# Patient Record
Sex: FEMALE | Race: WHITE | Employment: UNEMPLOYED | URBAN - METROPOLITAN AREA
[De-identification: names, ages, dates, MRNs, and addresses within clinical notes are randomized per-mention and may not be internally consistent; named-entity substitution may affect disease eponyms.]

---

## 2017-01-16 ENCOUNTER — ROUTINE PRENATAL (OUTPATIENT)
Dept: OBGYN CLINIC | Age: 32
End: 2017-01-16

## 2017-01-16 ENCOUNTER — CLINICAL SUPPORT (OUTPATIENT)
Dept: OBGYN CLINIC | Age: 32
End: 2017-01-16

## 2017-01-16 VITALS
DIASTOLIC BLOOD PRESSURE: 70 MMHG | HEART RATE: 98 BPM | BODY MASS INDEX: 37.73 KG/M2 | WEIGHT: 221 LBS | SYSTOLIC BLOOD PRESSURE: 129 MMHG | HEIGHT: 64 IN

## 2017-01-16 DIAGNOSIS — Z34.83 PRENATAL CARE, SUBSEQUENT PREGNANCY, THIRD TRIMESTER: Primary | ICD-10-CM

## 2017-01-16 DIAGNOSIS — Z3A.34 34 WEEKS GESTATION OF PREGNANCY: ICD-10-CM

## 2017-01-16 DIAGNOSIS — Z34.83 PRENATAL CARE, SUBSEQUENT PREGNANCY, THIRD TRIMESTER: ICD-10-CM

## 2017-01-16 NOTE — PATIENT INSTRUCTIONS

## 2017-01-16 NOTE — MR AVS SNAPSHOT
Visit Information Date & Time Provider Department Dept. Phone Encounter #  
 1/16/2017 10:45 AM Bekah Chapman, [facility] [phone] [id number] Follow-up Instructions Return in about 1 week (around 1/23/2017). Upcoming Health Maintenance Date Due  
 PAP AKA CERVICAL CYTOLOGY 3/13/2018 Allergies as of 1/16/2017  Review Complete On: 12/14/2016 By: Bekah Chapman, DO No Known Allergies Current Immunizations  Reviewed on 8/8/2015 Name Date Influenza Vaccine 11/16/2016 Tdap 6/18/2015 Not reviewed this visit You Were Diagnosed With   
  
 Codes Comments Prenatal care, subsequent pregnancy, third trimester    -  Primary ICD-10-CM: Z34.83 ICD-9-CM: V22.1 Vitals BP Pulse Height(growth percentile) Weight(growth percentile) LMP BMI  
 129/70 98 5' 4\" (1.626 m) 221 lb (100.2 kg) 05/18/2016 37.93 kg/m2 OB Status Smoking Status Pregnant Former Smoker Vitals History BMI and BSA Data Body Mass Index Body Surface Area  
 37.93 kg/m 2 2.13 m 2 Preferred Pharmacy Pharmacy Name Phone [facility] [address] [place] [address] [address] [phone] Your Updated Medication List  
  
   
This list is accurate as of: 1/16/17 11:28 AM.  Always use your most recent med list.  
  
  
  
  
 albuterol 90 mcg/actuation inhaler Commonly known as:  PROVENTIL HFA, VENTOLIN HFA, PROAIR HFA Take 1-2 Puffs by inhalation every four (4) hours as needed for Wheezing or Shortness of Breath. fluticasone-salmeterol 100-50 mcg/dose diskus inhaler Commonly known as:  ADVAIR Take 1 Puff by inhalation every twelve (12) hours. Peak Flow Meter Nasalyann John Use as directed two times daily for 2 weeks, log values and bring record to follow up, then as needed with worsening asthma symptoms. PNV#75-iron fum-FA-om3-dha-epa 28 mg iron- 800 mcg Cmpk Commonly known as:  ONE A DAY WOMEN'S PRENATAL DHA Take 1 Tab by mouth daily. Indications: PREGNANCY Follow-up Instructions Return in about 1 week (around 2017). To-Do List   
 2017 Imaging: AMB POC US OB >= 14 WKS, 1ST GESTATION Patient Instructions Weeks 34 to 36 of Your Pregnancy: Care Instructions Your Care Instructions By now, your baby and your belly have grown quite large. It is almost time to give birth. A full-term pregnancy can deliver between 37 and 42 weeks. Your baby's lungs are almost ready to breathe air. The bones in your baby's head are now firm enough to protect it, but soft enough to move down through the birth canal. 
You may feel excited, happy, anxious, or scared. You may wonder how you will know if you are in labor or what to expect during labor. Try to be flexible in your expectations of the birth. Because each birth is different, there is no way to know exactly what childbirth will be like for you. This care sheet will help you know what to expect and how to prepare. This may make your childbirth easier. If you haven't already had the Tdap shot during this pregnancy, talk to your doctor about getting it. It will help protect your  against pertussis infection. In the 36th week, most women have a test for group B streptococcus (GBS). GBS is a common bacteria that can live in the vagina and rectum. It can make your baby sick after birth. If you test positive, you will get antibiotics during labor. The medicine will keep your baby from getting the bacteria. Follow-up care is a key part of your treatment and safety. Be sure to make and go to all appointments, and call your doctor if you are having problems. It's also a good idea to know your test results and keep a list of the medicines you take. How can you care for yourself at home? Learn about pain relief choices · Pain is different for every woman. Talk with your doctor about your feelings about pain. · You can choose from several types of pain relief. These include medicine or breathing techniques, as well as comfort measures. You can use more than one option. · If you choose to have pain medicine during labor, talk to your doctor about your options. Some medicines lower anxiety and help with some of the pain. Others make your lower body numb so that you won't feel pain. · Be sure to tell your doctor about your pain medicine choice before you start labor or very early in your labor. You may be able to change your mind as labor progresses. · Rarely, a woman is put to sleep by medicine given through a mask or an IV. Labor and delivery · The first stage of labor has three parts: early, active, and transition. ¨ Most women have early labor at home. You can stay busy or rest, eat light snacks, drink clear fluids, and start counting contractions. ¨ When talking during a contraction gets hard, you may be moving to active labor. During active labor, you should head for the hospital if you are not there already. ¨ You are in active labor when contractions come every 3 to 4 minutes and last about 60 seconds. Your cervix is opening more rapidly. ¨ If your water breaks, contractions will come faster and stronger. ¨ During transition, your cervix is stretching, and contractions are coming more rapidly. ¨ You may want to push, but your cervix might not be ready. Your doctor will tell you when to push. · The second stage starts when your cervix is completely opened and you are ready to push. ¨ Contractions are very strong to push the baby down the birth canal. 
¨ You will feel the urge to push. You may feel like you need to have a bowel movement. ¨ You may be coached to push with contractions. These contractions will be very strong, but you will not have them as often. You can get a little rest between contractions. ¨ You may be emotional and irritable. You may not be aware of what is going on around you. ¨ One last push, and your baby is born. · The third stage is when a few more contractions push out the placenta. This may take 30 minutes or less. · The fourth stage is the welcome recovery. You may feel overwhelmed with emotions and exhausted but alert. This is a good time to start breastfeeding. Where can you learn more? Go to http://philly-damon.info/. Enter M564 in the search box to learn more about \"Weeks 34 to 36 of Your Pregnancy: Care Instructions. \" Current as of: May 30, 2016 Content Version: 11.1 © 4065-0896 [employer]. Care instructions adapted under license by [employer] (which disclaims liability or warranty for this information). If you have questions about a medical condition or this instruction, always ask your healthcare professional. [place] [address] any warranty or liability for your use of this information. Introducing \[facility] [place] [facility]\"" & HEALTH SERVICES! Dear Gabo Ace: Thank you for requesting a Appetise account. Our records indicate that you already have an active Appetise account. You can access your account anytime at [email or web address] Did you know that you can access your hospital and ER discharge instructions at any time in Appetise? You can also review all of your test results from your hospital stay or ER visit. Additional Information If you have questions, please visit the Frequently Asked Questions section of the Appetise website at [email or web address] Remember, Appetise is NOT to be used for urgent needs. For medical emergencies, dial 911. Now available from your iPhone and Android! Please provide this summary of care documentation to your next provider. Your primary care clinician is listed as NONE.  If you have any questions after today's visit, please call [phone].

## 2017-01-17 NOTE — PROGRESS NOTES
34w5d. Patient doing well. Denies contractions, decreased fetal movement, vaginal bleeding, leak of fluid. US done today in our office ordered for additional fetal heart views, patient was supposed to get US at [facility] but non-compliant since patient lives far. Today's US was insufficient fetal heart views. Offered another [facility] referral, pt declined. RTO in 1 week. I have verbalized the plan of care with patient. The patient was given a full opportunity to ask questions, indicated that her questions had been answered and expressed understanding.

## 2017-01-22 ENCOUNTER — HOSPITAL ENCOUNTER (INPATIENT)
Age: 32
LOS: 2 days | Discharge: HOME | DRG: 560 | End: 2017-01-24
Attending: OBSTETRICS & GYNECOLOGY | Admitting: OBSTETRICS & GYNECOLOGY
Payer: MEDICAID

## 2017-01-22 ENCOUNTER — ANESTHESIA EVENT (OUTPATIENT)
Dept: LABOR AND DELIVERY | Age: 32
DRG: 560 | End: 2017-01-22
Payer: MEDICAID

## 2017-01-22 ENCOUNTER — ANESTHESIA (OUTPATIENT)
Dept: LABOR AND DELIVERY | Age: 32
DRG: 560 | End: 2017-01-22
Payer: MEDICAID

## 2017-01-22 LAB
ABO + RH BLD: NORMAL
BASOPHILS # BLD AUTO: 0 K/UL (ref 0–0.06)
BASOPHILS # BLD: 0 % (ref 0–2)
BLOOD GROUP ANTIBODIES SERPL: NORMAL
DIFFERENTIAL METHOD BLD: ABNORMAL
EOSINOPHIL # BLD: 0.3 K/UL (ref 0–0.4)
EOSINOPHIL NFR BLD: 3 % (ref 0–5)
ERYTHROCYTE [DISTWIDTH] IN BLOOD BY AUTOMATED COUNT: 13.8 % (ref 11.6–14.5)
HCT VFR BLD AUTO: 33.4 % (ref 35–45)
HGB BLD-MCNC: 11.2 G/DL (ref 12–16)
LYMPHOCYTES # BLD AUTO: 22 % (ref 21–52)
LYMPHOCYTES # BLD: 2.1 K/UL (ref 0.9–3.6)
MCH RBC QN AUTO: 28.4 PG (ref 24–34)
MCHC RBC AUTO-ENTMCNC: 33.5 G/DL (ref 31–37)
MCV RBC AUTO: 84.8 FL (ref 74–97)
MONOCYTES # BLD: 0.5 K/UL (ref 0.05–1.2)
MONOCYTES NFR BLD AUTO: 5 % (ref 3–10)
NEUTS SEG # BLD: 6.9 K/UL (ref 1.8–8)
NEUTS SEG NFR BLD AUTO: 70 % (ref 40–73)
PLATELET # BLD AUTO: 201 K/UL (ref 135–420)
PMV BLD AUTO: 9.1 FL (ref 9.2–11.8)
RBC # BLD AUTO: 3.94 M/UL (ref 4.2–5.3)
SPECIMEN EXP DATE BLD: NORMAL
WBC # BLD AUTO: 10 K/UL (ref 4.6–13.2)

## 2017-01-22 PROCEDURE — 74011000250 HC RX REV CODE- 250: Performed by: NURSE ANESTHETIST, CERTIFIED REGISTERED

## 2017-01-22 PROCEDURE — 74011250637 HC RX REV CODE- 250/637

## 2017-01-22 PROCEDURE — 77030007879 HC KT SPN EPDRL TELE -B: Performed by: NURSE ANESTHETIST, CERTIFIED REGISTERED

## 2017-01-22 PROCEDURE — 77030010848 HC CATH INTUTR PRSS KOLB -B

## 2017-01-22 PROCEDURE — 77030020255 HC SOL INJ LR 1000ML BG

## 2017-01-22 PROCEDURE — 77030034849

## 2017-01-22 PROCEDURE — 86900 BLOOD TYPING SEROLOGIC ABO: CPT | Performed by: OBSTETRICS & GYNECOLOGY

## 2017-01-22 PROCEDURE — 85025 COMPLETE CBC W/AUTO DIFF WBC: CPT | Performed by: OBSTETRICS & GYNECOLOGY

## 2017-01-22 PROCEDURE — 74011250636 HC RX REV CODE- 250/636: Performed by: OBSTETRICS & GYNECOLOGY

## 2017-01-22 PROCEDURE — 65270000029 HC RM PRIVATE

## 2017-01-22 PROCEDURE — 77030009413 HC ELECTRD SCALP COVD -A

## 2017-01-22 PROCEDURE — 3E0S3CZ INTRODUCE REGIONAL ANESTH IN EPIDURAL SPACE, PERC: ICD-10-PCS | Performed by: OBSTETRICS & GYNECOLOGY

## 2017-01-22 PROCEDURE — 74011250636 HC RX REV CODE- 250/636: Performed by: NURSE ANESTHETIST, CERTIFIED REGISTERED

## 2017-01-22 PROCEDURE — 74011250636 HC RX REV CODE- 250/636

## 2017-01-22 PROCEDURE — 74011000258 HC RX REV CODE- 258: Performed by: OBSTETRICS & GYNECOLOGY

## 2017-01-22 PROCEDURE — 74011250637 HC RX REV CODE- 250/637: Performed by: OBSTETRICS & GYNECOLOGY

## 2017-01-22 PROCEDURE — 74011000250 HC RX REV CODE- 250

## 2017-01-22 RX ORDER — FENTANYL CITRATE 50 UG/ML
100 INJECTION, SOLUTION INTRAMUSCULAR; INTRAVENOUS ONCE
Status: COMPLETED | OUTPATIENT
Start: 2017-01-22 | End: 2017-01-22

## 2017-01-22 RX ORDER — PROMETHAZINE HYDROCHLORIDE 25 MG/ML
25 INJECTION, SOLUTION INTRAMUSCULAR; INTRAVENOUS
Status: DISCONTINUED | OUTPATIENT
Start: 2017-01-22 | End: 2017-01-24 | Stop reason: HOSPADM

## 2017-01-22 RX ORDER — PHENYLEPHRINE HCL IN 0.9% NACL 0.4MG/10ML
80 SYRINGE (ML) INTRAVENOUS AS NEEDED
Status: DISCONTINUED | OUTPATIENT
Start: 2017-01-22 | End: 2017-01-22 | Stop reason: HOSPADM

## 2017-01-22 RX ORDER — OXYTOCIN 10 [USP'U]/ML
10 INJECTION, SOLUTION INTRAMUSCULAR; INTRAVENOUS
Status: DISCONTINUED | OUTPATIENT
Start: 2017-01-22 | End: 2017-01-22 | Stop reason: HOSPADM

## 2017-01-22 RX ORDER — HYDROMORPHONE HYDROCHLORIDE 1 MG/ML
1 INJECTION, SOLUTION INTRAMUSCULAR; INTRAVENOUS; SUBCUTANEOUS
Status: DISCONTINUED | OUTPATIENT
Start: 2017-01-22 | End: 2017-01-22 | Stop reason: HOSPADM

## 2017-01-22 RX ORDER — ROPIVACAINE HYDROCHLORIDE 2 MG/ML
INJECTION, SOLUTION EPIDURAL; INFILTRATION; PERINEURAL AS NEEDED
Status: DISCONTINUED | OUTPATIENT
Start: 2017-01-22 | End: 2017-01-22 | Stop reason: HOSPADM

## 2017-01-22 RX ORDER — LIDOCAINE HYDROCHLORIDE AND EPINEPHRINE 15; 5 MG/ML; UG/ML
INJECTION, SOLUTION EPIDURAL AS NEEDED
Status: DISCONTINUED | OUTPATIENT
Start: 2017-01-22 | End: 2017-01-22 | Stop reason: HOSPADM

## 2017-01-22 RX ORDER — OXYTOCIN/RINGER'S LACTATE 20/1000 ML
125 PLASTIC BAG, INJECTION (ML) INTRAVENOUS CONTINUOUS
Status: DISCONTINUED | OUTPATIENT
Start: 2017-01-22 | End: 2017-01-24 | Stop reason: HOSPADM

## 2017-01-22 RX ORDER — LIDOCAINE HYDROCHLORIDE 10 MG/ML
30 INJECTION, SOLUTION EPIDURAL; INFILTRATION; INTRACAUDAL; PERINEURAL AS NEEDED
Status: DISCONTINUED | OUTPATIENT
Start: 2017-01-22 | End: 2017-01-22 | Stop reason: HOSPADM

## 2017-01-22 RX ORDER — OXYTOCIN/RINGER'S LACTATE 20/1000 ML
500 PLASTIC BAG, INJECTION (ML) INTRAVENOUS ONCE
Status: COMPLETED | OUTPATIENT
Start: 2017-01-22 | End: 2017-01-22

## 2017-01-22 RX ORDER — TERBUTALINE SULFATE 1 MG/ML
0.25 INJECTION SUBCUTANEOUS
Status: DISCONTINUED | OUTPATIENT
Start: 2017-01-22 | End: 2017-01-22 | Stop reason: HOSPADM

## 2017-01-22 RX ORDER — ONDANSETRON 2 MG/ML
4 INJECTION INTRAMUSCULAR; INTRAVENOUS
Status: DISCONTINUED | OUTPATIENT
Start: 2017-01-22 | End: 2017-01-22 | Stop reason: HOSPADM

## 2017-01-22 RX ORDER — ZOLPIDEM TARTRATE 5 MG/1
5 TABLET ORAL
Status: DISCONTINUED | OUTPATIENT
Start: 2017-01-22 | End: 2017-01-24 | Stop reason: HOSPADM

## 2017-01-22 RX ORDER — CARBOPROST TROMETHAMINE 250 UG/ML
250 INJECTION, SOLUTION INTRAMUSCULAR
Status: DISCONTINUED | OUTPATIENT
Start: 2017-01-22 | End: 2017-01-22 | Stop reason: HOSPADM

## 2017-01-22 RX ORDER — MAG HYDROX/ALUMINUM HYD/SIMETH 200-200-20
15 SUSPENSION, ORAL (FINAL DOSE FORM) ORAL
Status: DISCONTINUED | OUTPATIENT
Start: 2017-01-22 | End: 2017-01-22 | Stop reason: HOSPADM

## 2017-01-22 RX ORDER — SODIUM CHLORIDE, SODIUM LACTATE, POTASSIUM CHLORIDE, CALCIUM CHLORIDE 600; 310; 30; 20 MG/100ML; MG/100ML; MG/100ML; MG/100ML
125 INJECTION, SOLUTION INTRAVENOUS CONTINUOUS
Status: DISCONTINUED | OUTPATIENT
Start: 2017-01-22 | End: 2017-01-24 | Stop reason: HOSPADM

## 2017-01-22 RX ORDER — MISOPROSTOL 200 UG/1
800 TABLET ORAL
Status: DISCONTINUED | OUTPATIENT
Start: 2017-01-22 | End: 2017-01-24 | Stop reason: HOSPADM

## 2017-01-22 RX ORDER — ACETAMINOPHEN 325 MG/1
650 TABLET ORAL
Status: DISCONTINUED | OUTPATIENT
Start: 2017-01-22 | End: 2017-01-24 | Stop reason: HOSPADM

## 2017-01-22 RX ORDER — AMOXICILLIN 250 MG
1 CAPSULE ORAL
Status: DISCONTINUED | OUTPATIENT
Start: 2017-01-22 | End: 2017-01-24 | Stop reason: HOSPADM

## 2017-01-22 RX ORDER — NALBUPHINE HYDROCHLORIDE 10 MG/ML
10 INJECTION, SOLUTION INTRAMUSCULAR; INTRAVENOUS; SUBCUTANEOUS
Status: DISCONTINUED | OUTPATIENT
Start: 2017-01-22 | End: 2017-01-22 | Stop reason: HOSPADM

## 2017-01-22 RX ORDER — METHYLERGONOVINE MALEATE 0.2 MG/ML
0.2 INJECTION INTRAVENOUS AS NEEDED
Status: DISCONTINUED | OUTPATIENT
Start: 2017-01-22 | End: 2017-01-22 | Stop reason: HOSPADM

## 2017-01-22 RX ORDER — IBUPROFEN 400 MG/1
800 TABLET ORAL
Status: DISCONTINUED | OUTPATIENT
Start: 2017-01-22 | End: 2017-01-24 | Stop reason: HOSPADM

## 2017-01-22 RX ORDER — OXYTOCIN IN 5 % DEXTROSE 30/500 ML
.5-2 PLASTIC BAG, INJECTION (ML) INTRAVENOUS
Status: DISCONTINUED | OUTPATIENT
Start: 2017-01-22 | End: 2017-01-22 | Stop reason: HOSPADM

## 2017-01-22 RX ORDER — MINERAL OIL
30 OIL (ML) ORAL AS NEEDED
Status: DISCONTINUED | OUTPATIENT
Start: 2017-01-22 | End: 2017-01-22 | Stop reason: HOSPADM

## 2017-01-22 RX ORDER — OXYCODONE AND ACETAMINOPHEN 5; 325 MG/1; MG/1
2 TABLET ORAL
Status: DISCONTINUED | OUTPATIENT
Start: 2017-01-22 | End: 2017-01-24 | Stop reason: HOSPADM

## 2017-01-22 RX ORDER — CASTOR OIL 100 %
OIL (ML) ORAL
Status: COMPLETED
Start: 2017-01-22 | End: 2017-01-22

## 2017-01-22 RX ADMIN — SODIUM CHLORIDE, SODIUM LACTATE, POTASSIUM CHLORIDE, AND CALCIUM CHLORIDE 125 ML/HR: 600; 310; 30; 20 INJECTION, SOLUTION INTRAVENOUS at 05:08

## 2017-01-22 RX ADMIN — AMPICILLIN SODIUM 2 G: 2 INJECTION, POWDER, FOR SOLUTION INTRAMUSCULAR; INTRAVENOUS at 02:53

## 2017-01-22 RX ADMIN — SODIUM CHLORIDE, SODIUM LACTATE, POTASSIUM CHLORIDE, AND CALCIUM CHLORIDE 1000 ML: 600; 310; 30; 20 INJECTION, SOLUTION INTRAVENOUS at 08:30

## 2017-01-22 RX ADMIN — Medication 10000 MILLI-UNITS/HR: at 15:30

## 2017-01-22 RX ADMIN — AMPICILLIN SODIUM 2 G: 2 INJECTION, POWDER, FOR SOLUTION INTRAMUSCULAR; INTRAVENOUS at 11:15

## 2017-01-22 RX ADMIN — ALUMINUM HYDROXIDE, MAGNESIUM HYDROXIDE, AND SIMETHICONE 15 ML: 200; 200; 20 SUSPENSION ORAL at 04:50

## 2017-01-22 RX ADMIN — Medication 10 ML/HR: at 09:46

## 2017-01-22 RX ADMIN — EPHEDRINE SULFATE 5 MG: 50 INJECTION INTRAMUSCULAR; INTRAVENOUS; SUBCUTANEOUS at 11:19

## 2017-01-22 RX ADMIN — Medication 4 MILLI-UNITS/MIN: at 06:15

## 2017-01-22 RX ADMIN — Medication 2 MILLI-UNITS/MIN: at 05:07

## 2017-01-22 RX ADMIN — EPHEDRINE SULFATE 5 MG: 50 INJECTION INTRAMUSCULAR; INTRAVENOUS; SUBCUTANEOUS at 11:32

## 2017-01-22 RX ADMIN — ROPIVACAINE HYDROCHLORIDE 3 ML: 2 INJECTION, SOLUTION EPIDURAL; INFILTRATION; PERINEURAL at 09:51

## 2017-01-22 RX ADMIN — LIDOCAINE HYDROCHLORIDE AND EPINEPHRINE 3 ML: 15; 5 INJECTION, SOLUTION EPIDURAL at 09:40

## 2017-01-22 RX ADMIN — SODIUM CHLORIDE, SODIUM LACTATE, POTASSIUM CHLORIDE, AND CALCIUM CHLORIDE 125 ML/HR: 600; 310; 30; 20 INJECTION, SOLUTION INTRAVENOUS at 11:06

## 2017-01-22 RX ADMIN — FENTANYL CITRATE 100 MCG: 50 INJECTION, SOLUTION INTRAMUSCULAR; INTRAVENOUS at 09:40

## 2017-01-22 RX ADMIN — Medication 125 ML/HR: at 15:31

## 2017-01-22 RX ADMIN — CASTOR OIL 59 ML: 100 LIQUID ORAL at 14:08

## 2017-01-22 RX ADMIN — AMPICILLIN SODIUM 2 G: 2 INJECTION, POWDER, FOR SOLUTION INTRAMUSCULAR; INTRAVENOUS at 06:49

## 2017-01-22 RX ADMIN — ROPIVACAINE HYDROCHLORIDE 5 ML: 2 INJECTION, SOLUTION EPIDURAL; INFILTRATION; PERINEURAL at 09:50

## 2017-01-22 RX ADMIN — SODIUM CHLORIDE, SODIUM LACTATE, POTASSIUM CHLORIDE, AND CALCIUM CHLORIDE 500 ML: 600; 310; 30; 20 INJECTION, SOLUTION INTRAVENOUS at 02:55

## 2017-01-22 NOTE — ROUTINE PROCESS
Bedside and Verbal shift change report given to KIMBERLY Camara (oncoming nurse) by Juan Daniel Wu. Ritu Lyle RN (offgoing nurse). Report included the following information SBAR, Intake/Output, MAR and Recent Results.

## 2017-01-22 NOTE — PROGRESS NOTES
0915: Paged anesthesia, returned call. Will put in orders for epidural and come up. Patient being bolused with LR.    0920:  Anesthesia in room for epidural    0923: Time out for epidural.    0940: test dose

## 2017-01-22 NOTE — ANESTHESIA PROCEDURE NOTES
Epidural Block    Start time: 1/22/2017 9:20 AM  End time: 1/22/2017 9:51 AM  Performed by: Mnady Bell  Authorized by: Mandy Bell     Pre-Procedure  Indication: labor epidural    Preanesthetic Checklist: patient identified, risks and benefits discussed, anesthesia consent, patient being monitored, timeout performed and anesthesia consent    Timeout Time: 09:28        Epidural:   Patient position:  Seated  Prep region:  Lumbar  Prep: Betadine and Patient draped    Prep comment:  X3  Location:  L2-3    Needle and Epidural Catheter:   Needle Type:  Tuohy  Needle Gauge:  18 G  Injection Technique:  Loss of resistance using saline  Attempts:  1  Catheter Size:  20 G  Catheter at Skin Depth (cm):  12  Depth in Epidural Space (cm):  6  Events: no blood with aspiration, no cerebrospinal fluid with aspiration, no paresthesia and negative aspiration test    Test Dose:  Lidocaine 1.5% w/ epi and negative (3 cc's @ 0940)    Assessment:   Catheter Secured:  Tegaderm and tape  Insertion:  Uncomplicated  Patient tolerance:  Patient tolerated the procedure well with no immediate complications  Fentanyl 50 + 50 mcg via epidural at 4781

## 2017-01-22 NOTE — PROGRESS NOTES
Dr. Lauro Patel reviewed tracing with RN. Stated to continue pitocin and put oxygen on for late variable decelerations.

## 2017-01-22 NOTE — H&P
History & Physical    Name: Karin Andrade MRN: [id number]  SSN: [id number]    YOB: 1985  Age: 28 y.o. Sex: female        Subjective:     Estimated Date of Delivery: 17  OB History      Para Term  AB TAB SAB Ectopic Multiple Living    4 2 1 1 1 1   0 1          Ms. Mallory Kee is admitted with pregnancy at 35w4d for active labor. Prenatal course was complicated by late prenatal care. .Prenatal care has been followed by Jennie Melendez. Please see prenatal records for details. She has h/o PTD @ 35 weeks in . Past Medical History   Diagnosis Date    Asthma     Gestational diabetes     Hypertension      Past Surgical History   Procedure Laterality Date    Hx lap cholecystectomy  2014    Hx dilation and curettage       ETP    Hx cholecystectomy       Social History     Occupational History    Not on file. Social History Main Topics    Smoking status: Former Smoker     Packs/day: 2.00    Smokeless tobacco: Not on file    Alcohol use No    Drug use: No    Sexual activity: Yes     Partners: Male     Family History   Problem Relation Age of Onset    Asthma Mother     Hypertension Mother     Diabetes Maternal Grandmother     Heart Disease Maternal Grandmother     Lung Disease Maternal Grandfather     Hypertension Father        No Known Allergies  Prior to Admission medications    Medication Sig Start Date End Date Taking? Authorizing Provider   albuterol (PROVENTIL HFA, VENTOLIN HFA, PROAIR HFA) 90 mcg/actuation inhaler Take 1-2 Puffs by inhalation every four (4) hours as needed for Wheezing or Shortness of Breath. 10/19/16  Yes Bekah Chapman DO   PNV#75-iron fum-FA-om3-dha-epa (ONE A DAY WOMEN'S PRENATAL DHA) 28 mg iron- 800 mcg cmpk Take 1 Tab by mouth daily. Indications: PREGNANCY 16  Yes Bekah Chapman DO   fluticasone-salmeterol (ADVAIR) 100-50 mcg/dose diskus inhaler Take 1 Puff by inhalation every twelve (12) hours. 11/16/16   Abhijit De La Torre DO   Peak Flow Meter cruzito Use as directed two times daily for 2 weeks, log values and bring record to follow up, then as needed with worsening asthma symptoms. 3/3/15   Harriett Ramirez DO        Review of Systems: A comprehensive review of systems was negative except for that written in the HPI. Objective:     Vitals:  Vitals:    01/22/17 0946 01/22/17 0949 01/22/17 0951 01/22/17 0956   BP: 109/59 97/81 109/58 115/61   Pulse: 76 77 84 77   Resp:       Temp:       SpO2: 93%  100% 100%   Weight:       Height:            Physical Exam:  Heart: Regular rate and rhythm  Lung: clear to auscultation throughout lung fields, no wheezes, no rales, no rhonchi and normal respiratory effort  Fundus: soft and non tender  Perineum: blood absent, amniotic fluid present- clear  Cervical Exam: 5 cm dilated    60% effaced    -1 station    Presenting Part: cephalic  Membranes:  Premature Rupture of Membranes; Amniotic Fluid: clear fluid  Fetal Heart Rate: Variability: moderate    Prenatal Labs:   Lab Results   Component Value Date/Time    Rubella, External IMMUNE  08/22/2016    HBsAg, External NEGATIVE  08/22/2016    HIV, External NON REACTIVE  08/22/2016    RPR, External NON REACTIVE  08/22/2016    Gonorrhea, External negative 03/03/2015    Chlamydia, External negative 03/03/2015         Assessment/Plan:     Plan: Admit for Reassuring fetal status, Labor  Not progressing normally  continue pitocin augmentation, Continue plan for vaginal delivery. Group B Strep was not tested.       Signed By:  Curt Zepeda MD     January 22, 2017

## 2017-01-22 NOTE — ROUTINE PROCESS
0222Pt arrived to unit with complaints of ROM @ 128 am. Amnisure and Nitrazine positive. SVE 3/60/+3. Paged Dr Shelley Mayorga given orders for admission, antibiotics for unknown GBS and  rupture of membranes. 0230Consents signed, IV placed and Labs sent.     1186 Updated Dr Shelley Mayorga on patient

## 2017-01-22 NOTE — PROGRESS NOTES
Dr. Katelyn Cameron on unit for patient assessment; RN requested IUPC r/t difficulty picking up contractions and pt on pitocin r/t obesity. Dr. Katelyn Cameron stated she would put in IUPC.

## 2017-01-22 NOTE — PROGRESS NOTES
1358: Dr. Chick Kanner at bedside for infant delivery    94 31 11: Patient dialation complete    94 31 11: Nursery and Pediatrican at bedside for delivery    [phone]: Begin pushing    1411:  of VMI. Nursery nurse and Pediatrician assumed care of infant. Infant crying after birth. 1419: Delivery of placenta.

## 2017-01-22 NOTE — IP AVS SNAPSHOT
[place] [address] Patient: Arnaud Begum MRN: [id number] [place]:403 You are allergic to the following No active allergies Immunizations Administered for This Admission Name Date Tdap  Deferred () Recent Documentation Height Weight Breastfeeding? BMI OB Status Smoking Status 1.626 m 100.2 kg Unknown 37.93 kg/m2 Recent pregnancy Former Smoker Emergency Contacts Name Discharge Info Relation Home Work Mobile Josselin Arndt  Other Relative [6] [phone] Grecia Arndt  Other Relative [6] [phone] About your hospitalization You were admitted on:  2017 You last received care in the:  [place] [address] You were discharged on:  2017 Unit phone number:  [phone] Why you were hospitalized Your primary diagnosis was:  Not on File Your diagnoses also included:   Labor, Delivered, Current Hospitalization, Single Live Birth Providers Seen During Your Hospitalizations Provider Role Specialty Primary office phone Oscar Clifton MD Attending Provider Obstetrics & Gynecology [phone] Your Primary Care Physician (PCP) Primary Care Physician Office Phone Office Fax NONE ** None ** ** None ** Follow-up Information Follow up With Details Comments Contact Info Oscar Clifton MD Schedule an appointment as soon as possible for a visit in 6 weeks postpartum exam [address] [address] [phone] Current Discharge Medication List  
  
START taking these medications Dose & Instructions Dispensing Information Comments Morning Noon Evening Bedtime  
 ibuprofen 800 mg tablet Commonly known as:  MOTRIN Your next dose is: Today, Tomorrow Other:  _________ Dose:  800 mg Take 1 Tab by mouth every eight (8) hours as needed. Quantity:  30 Tab Refills:  1 CONTINUE these medications which have NOT CHANGED Dose & Instructions Dispensing Information Comments Morning Noon Evening Bedtime  
 albuterol 90 mcg/actuation inhaler Commonly known as:  PROVENTIL HFA, VENTOLIN HFA, PROAIR HFA Your next dose is: Today, Tomorrow Other:  _________ Dose:  1-2 Puff Take 1-2 Puffs by inhalation every four (4) hours as needed for Wheezing or Shortness of Breath. Quantity:  1 Inhaler Refills:  1  
     
   
   
   
  
 fluticasone-salmeterol 100-50 mcg/dose diskus inhaler Commonly known as:  ADVAIR Your next dose is: Today, Tomorrow Other:  _________ Dose:  1 Puff Take 1 Puff by inhalation every twelve (12) hours. Quantity:  1 Inhaler Refills:  0 Peak Flow Meter Mariza Alexander Your next dose is: Today, Tomorrow Other:  _________ Use as directed two times daily for 2 weeks, log values and bring record to follow up, then as needed with worsening asthma symptoms. Quantity:  1 Device Refills:  0 PNV#75-iron [id number] 28 mg iron- 800 mcg Cmpk Commonly known as:  ONE A DAY WOMEN'S PRENATAL DHA Your next dose is: Today, Tomorrow Other:  _________ Dose:  1 Tab Take 1 Tab by mouth daily. Indications: PREGNANCY Quantity:  30 Tab Refills:  11 Where to Get Your Medications These medications were sent to [address] [place] [address]  AT [address]  [address] [facility], [address] [place] [address] Phone:  [phone]  
  ibuprofen 800 mg tablet Discharge Instructions [facility] 
[place] [id number] PROCEDURE: Spontaneous Vaginal Delivery [facility] if any of the following occur: ? You are unable to urinate. Urgency to urinate is not uncommon. ? Excessive vaginal bleeding > 1 maxi pad an hour for more then 2 hours straight. ? Temperature above 101.0° and / or chills. ? You are nauseous and / or vomiting and you cannot hold down any fluids. ? Your pain is not controlled with the pain medication prescribed. Special Considerations:  
 
? If you had a  section delivery do not drive for at least 72 hours after the procedure and until you are no longer taking narcotic pain medication and you are able to move and react without hesitation. ? For the first two weeks you should rest and take care of yourself and your baby! 
? You may return to work in 6 weeks. Pelvic rest (nothing in the vagina) for 6 weeks. No heavy lifting over 10 pounds & no strenuous exercise for 6 weeks. Other instructions:   
  
 
 
 
Please continue any of your other regular home medications including your vitamins and other supplements. . 
If you have not already scheduled your post partum appointment please do so with our office staff. Your post partum appointment should be in 6 weeks. Please contact [facility] or go to the nearest Emergency Department / Urgent Care facility for any other medical questions or concerns and to schedule your post partum visit. After Your Delivery (the Postpartum Period): After Your Visit Your Care Instructions Congratulations on the birth of your baby. Like pregnancy, the  period can be a time of excitement, uche, and exhaustion. You may look at your wondrous little baby and feel happy. You may also be overwhelmed by your new sleep hours and new responsibilities. At first, babies often sleep during the days and are awake at night. They do not have a pattern or routine.  They may make sudden gasps, jerk themselves awake, or look like they have crossed eyes. These are all normal, and they may even make you smile. In these first weeks after delivery, try to take good care of yourself. It may take 4 to 6 weeks to feel like yourself again, and possibly longer if you had a  birth. You will likely feel very tired for several weeks. Your days will be full of ups and downs, but lots of uche as well. Follow-up care is a key part of your treatment and safety. Be sure to make and go to all appointments, and call your doctor if you are having problems. It's also a good idea to know your test results and keep a list of the medicines you take. How can you care for yourself at home? Take care of your body after delivery Use pads instead of tampons for the bloody flow that may last as long as 2 weeks. Ease cramps with ibuprofen (Advil, Motrin). Ease soreness of hemorrhoids and the area between your vagina and rectum with ice compresses or witch hazel pads. Ease constipation by drinking lots of fluid and eating high-fiber foods. Ask your doctor about over-the-counter stool softeners. Cleanse yourself with a gentle squeeze of warm water from a bottle instead of wiping with toilet paper. Take a sitz bath in warm water several times a day. Wear a good nursing bra. Ease sore and swollen breasts with warm, wet washcloths. If you are not breast-feeding, use ice rather than heat for breast soreness. Your period may not start for several months if you are breast-feeding. You may bleed more, and longer at first, than you did before you got pregnant. Wait until you are healed (about 4 to 6 weeks) before you have sexual intercourse. Your doctor will tell you when it is okay to have sex. Try not to travel with your baby for 5 or 6 weeks. If you take a long car trip, make frequent stops to walk around and stretch. Avoid exhaustion Rest every day. Try to nap when your baby naps. Ask another adult to be with you for a few days after delivery. Plan for  if you have other children. Stay flexible so you can eat at odd hours and sleep when you need to. Both you and your baby are making new schedules. Plan small trips to get out of the house. Change can make you feel less tired. Ask for help with housework, cooking, and shopping. Remind yourself that your job is to care for your baby. Know about help for postpartum depression \"Baby blues\" are common for the first 1 to 2 weeks after birth. You may cry or feel sad or irritable for no reason. Rest whenever you can. Being tired makes it harder to handle your emotions. Go for walks with your baby. Talk to your partner, friends, and family about your feelings. If your symptoms last for more than a few weeks, or if you feel very depressed, ask your doctor for help. Postpartum depression can be treated. Support groups and counseling can help. Sometimes medicine can also help. Stay healthy Eat healthy foods so you have more energy, make good breast milk, and lose extra baby pounds. If you breast-feed, avoid alcohol and drugs. Stay smoke-free. If you quit during pregnancy, congratulations. Start daily exercise after 4 to 6 weeks, but rest when you feel tired. Learn exercises to tone your belly. Do Kegel exercises to regain strength in your pelvic muscles. You can do these exercises while you stand or sit. Squeeze the same muscles you would use to stop your urine. Your belly and thighs should not move. Hold the squeeze for 3 seconds, and then relax for 3 seconds. Start with 3 seconds. Then add 1 second each week until you are able to squeeze for 10 seconds. Repeat the exercise 10 to 15 times for each session. Do three or more sessions each day. Find a class for new mothers and new babies that has an exercise time.   
If you had a  birth, give yourself a bit more time before you exercise, and be careful. When should you call for help? Call 911 anytime you think you may need emergency care. For example, call if: You passed out (lost consciousness). Call your doctor now or seek immediate medical care if:  
You have severe vaginal bleeding. This means you are passing blood clots and soaking through a pad each hour for 2 or more hours. You are dizzy or lightheaded, or you feel like you may faint. You have a fever. You have new belly pain, or your pain gets worse. Watch closely for changes in your health, and be sure to contact your doctor if:  
Your vaginal bleeding seems to be getting heavier. You have new or worse vaginal discharge. You feel sad, anxious, or hopeless for more than a few days. You do not get better as expected. Where can you learn more? Go to [employer] Enter A461 in the search box to learn more about \"After Your Delivery (the Postpartum Period): After Your Visit. \"   
© 5217-1535 Healthwise, Incorporated. Care instructions adapted under license by Caitlin Johnston (which disclaims liability or warranty for this information). This care instruction is for use with your licensed healthcare professional. If you have questions about a medical condition or this instruction, always ask your healthcare professional. [address] any warranty or liability for your use of this information. Content Version: 86.5.423471; Current as of: June 4, 2014 Patient armband removed and shredded Discharge Instructions Attachments/References DEPRESSION: POSTPARTUM (ENGLISH) Discharge Orders None Agency [facility] Announcement We are excited to announce that we are making your provider's discharge notes available to you in [employer].   You will see these notes when they are completed and signed by the physician that discharged you from your recent hospital stay. If you have any questions or concerns about any information you see in [employer], please call the Health Information Department where you were seen or reach out to your Primary Care Provider for more information about your plan of care. Introducing [facility]! Dear John Eagle: Thank you for requesting a [employer] account. Our records indicate that you already have an active [employer] account. You can access your account anytime at [email or web address] Did you know that you can access your hospital and ER discharge instructions at any time in [employer]? You can also review all of your test results from your hospital stay or ER visit. Additional Information If you have questions, please visit the Frequently Asked Questions section of the [employer] website at [email or web address] Remember, [employer] is NOT to be used for urgent needs. For medical emergencies, dial 911. Now available from your iPhone and Android! General Information Please provide this summary of care documentation to your next provider. Patient Signature:  ____________________________________________________________ Date:  ____________________________________________________________  
  
Joe Swann Provider Signature:  ____________________________________________________________ Date:  ____________________________________________________________ More Information Depression After Childbirth: Care Instructions Your Care Instructions Many women get the \"baby blues\" during the first few days after childbirth. You may lose sleep, feel irritable, and cry easily. You may feel happy one minute and sad the next. Hormone changes are one cause of these emotional changes. Also, the demands of a new baby, along with visits from relatives or other family needs, add to a mother's stress.  The \"baby blues\" often peak around the fourth day. Then they ease up in less than 2 weeks. If your moodiness or anxiety lasts for more than 2 weeks, or if you feel like life is not worth living, you may have postpartum depression. This is different for each mother. Some mothers with serious depression may worry intensely about their infant's well-being. Others may feel distant from their child. Some mothers might even feel that they might harm their baby. A mother may have signs of paranoia, wondering if someone is watching her. Depression is not a sign of weakness. It is a medical condition that requires treatment. Medicine and counseling often work well to reduce depression. Talk to your doctor about taking antidepressant medicine while breastfeeding. Follow-up care is a key part of your treatment and safety. Be sure to make and go to all appointments, and call your doctor if you are having problems. It's also a good idea to know your test results and keep a list of the medicines you take. How do you know if you are depressed? With all the changes in your life, you may not know if you are depressed. Pregnancy sometimes causes changes in how you feel that are similar to the symptoms of depression. Symptoms of depression include: · Feeling sad or hopeless and losing interest in daily activities. These are the most common symptoms of depression. · Sleeping too much or not enough. · Feeling tired. You may feel as if you have no energy. · Eating too much or too little. · Writing or talking about death, such as writing suicide notes or talking about guns, knives, or pills. Keep the numbers for these national suicide hotlines: 2-788-244-TALK (7-952.369.1207) and 9-641-CBOKUJI (1-170.745.9613). If you or someone you know talks about suicide or feeling hopeless, get help right away. How can you care for yourself at home? · Be safe with medicines. Take your medicines exactly as prescribed.  Call your doctor if you think you are having a problem with your medicine. · Eat a healthy diet so that you can keep up your energy. · Get regular daily exercise, such as walks, to help improve your mood. · Get as much sunlight as possible. Keep your shades and curtains open. Get outside as much as you can. · Avoid using alcohol or other substances to feel better. · Get as much rest and sleep as possible. Avoid doing too much. Being too tired can increase depression. · Play stimulating music throughout your day and soothing music at night. · Schedule outings and visits with friends and family. Ask them to call you regularly, so that you do not feel alone. · Ask for help with preparing food and other daily tasks. Family and friends are often happy to help a mother with a . · Be honest with yourself and those who care about you. Tell them about your struggle. · Join a support group of new mothers. No one can better understand the challenges of caring for a  than other new mothers. · If you feel like life is not worth living or are feeling hopeless, get help right away. Keep the numbers for these national suicide hotlines: 9-485-039-TALK (3-297.786.8878) and 5-547-AZPXMUL (6-433.259.3780). When should you call for help? Call 911 anytime you think you may need emergency care. For example, call if: 
· You feel you cannot stop from hurting yourself, your baby, or someone else. Call your doctor now or seek immediate medical care if: 
· You are having trouble caring for yourself or your baby. · You hear voices. Watch closely for changes in your health, and be sure to contact your doctor if: 
· You have problems with your depression medicine. · You do not get better as expected. Where can you learn more? Go to http://philly-damon.info/. Enter L331 in the search box to learn more about \"Depression After Childbirth: Care Instructions. \" Current as of: 2016 Content Version: 11.1 © 5496-4226 [employer] Incorporated. Care instructions adapted under license by [facility] (which disclaims liability or warranty for this information). If you have questions about a medical condition or this instruction, always ask your healthcare professional. [address] any warranty or liability for your use of this information.

## 2017-01-23 LAB
HCT VFR BLD AUTO: 34.6 % (ref 35–45)
HGB BLD-MCNC: 11.3 G/DL (ref 12–16)

## 2017-01-23 PROCEDURE — 85014 HEMATOCRIT: CPT | Performed by: OBSTETRICS & GYNECOLOGY

## 2017-01-23 PROCEDURE — 36415 COLL VENOUS BLD VENIPUNCTURE: CPT | Performed by: OBSTETRICS & GYNECOLOGY

## 2017-01-23 PROCEDURE — 75410000003 HC RECOV DEL/VAG/CSECN EA 0.5 HR

## 2017-01-23 PROCEDURE — 65270000029 HC RM PRIVATE

## 2017-01-23 PROCEDURE — 75410000002 HC LABOR FEE PER 1 HR

## 2017-01-23 PROCEDURE — 76060000078 HC EPIDURAL ANESTHESIA

## 2017-01-23 PROCEDURE — 75410000000 HC DELIVERY VAGINAL/SINGLE

## 2017-01-23 PROCEDURE — 85018 HEMOGLOBIN: CPT | Performed by: OBSTETRICS & GYNECOLOGY

## 2017-01-23 PROCEDURE — 74011250637 HC RX REV CODE- 250/637: Performed by: OBSTETRICS & GYNECOLOGY

## 2017-01-23 RX ADMIN — IBUPROFEN 800 MG: 400 TABLET ORAL at 22:14

## 2017-01-23 RX ADMIN — IBUPROFEN 800 MG: 400 TABLET ORAL at 03:59

## 2017-01-23 RX ADMIN — Medication 1 TABLET: at 22:14

## 2017-01-23 RX ADMIN — IBUPROFEN 800 MG: 400 TABLET ORAL at 14:30

## 2017-01-23 NOTE — PROGRESS NOTES
Pt given breast pump with instructions on how to set up, and intstructed to pump 15-20 min every 3 hrs. Pt verbalized understanding.

## 2017-01-23 NOTE — PROGRESS NOTES
~~ 0705 ASSUME CARE OF PT SLEEPING IN BED, SR UP X2, CB IN REACH-- NOT DISTURBED. BABY IS SCN    ~~0800 PT SITTING UP IN BED EATING REG DIET BREAKFAST TRAY- PT DENIES PAIN, INCREASED BLEEDING, DIFF VOIDING, NAUSEA OR ITCHING. PT REPORTS SHE DOES HAVE CRAMPING W/ PUMPING-- REVIEWED WNL--  OFFERED TYLENOL BETWEEN MOTRIN DOSES-- DECLINES. INSTRUCTED TO ASK IF DESIRED- UNDERSTANDS. NO SUPPORT PERSON HERE--  REVIEWED QUIET TIME, EMERGENCY CORD IN THE BR & WHEN TO USE AND WATER CONSUMPTION-- PT HAS 1/2 FULL MUG. PT OFFERS NO C/O OR REQ--    ~~0900 PT FINISHED FOOD W/O NAUSEA-  ASSESSMENT AS CHARTED- PT TRYING TO SLEEP SINCE BABY IS SCN-- PT IS PUMPING Q3 HRS--  PT HAS REQUESTED NO HOURLY ROUNDING AS IT IS DISTURBING. REVIEWED FOR PT TO CALL W/ ANY C/O, REQ OR NEEDS-- PT VERB UNDERSTANDING--  LIGHTS OUT & BLINDS CLOSED--      ~~1045 PT AMBULATORY TO THE NSY TO SEE SCN BABY-- PT IN OWN CLOTHS PER DESIRE-  PT OFFERS NO C/O OR REQ--      ~~1145 HL REMOVED. FAMILY IN VISITING- NO NEEDS VOICED    ~~1200 REG DIET LUNCH TRAY SERVED    ~~1330 PT VISITING SCN BABY--  NO NEEDS VOICED. QUIET TIME    ~~1430 MOTRIN GIVEN PER REQ FOR MILD CRAMPING--     ~~1530 PAIN MED EFFECTIVE. FOB & TODDLER HERE VISITING- PT DENIES INCREASED BLEEDING, ITCHING OR NAUSEA. PT STILL PREFERS NO HOURLY ROUNDING--  SHE KNOWS TO CALL W/ ANY NEEDS-     ~~1700 REG DIET DINNER TRAY SERVED-    ~~1830 FOB BROUGHT TODDLER IN THEN LEFT (!).   FRIEND COMING FOR CHILD WHO IS VERY FUSSY- PT DENIES PAIN- OFFERS NO C/O OR REQ-- LIGHTS DIM, CB IN REACH

## 2017-01-23 NOTE — L&D DELIVERY NOTE
Delivery Summary    Patient: Moody Cerna MRN: [id number]  SSN: [id number]    YOB: 1985  Age: 28 y.o. Sex: female        Labor Events:    Labor: Yes    Rupture Date: 2017    Rupture Time: 1:59 PM    Rupture Type SROM    Amniotic Fluid Volume: Moderate     Amniotic Fluid Description: Clear       Induction: None         Augmentation: Oxytocin    Labor Complications: None     Additional Complications:        Cervical Ripening:       None      Delivery Events:  Episiotomy: None    Laceration(s): None       Repaired: None     Number of Repair Packets:      Suture Type and Size: None        Estimated Blood Loss (ml):   350       Information for the patient's :  Rosmery Roberson [[id number]]     Delivery Summary - Baby    Delivery Date: 2017   Delivery Time: 2:11 PM   Delivery Type: Vaginal, Spontaneous Delivery  Sex:  male  Gestational Age: 35w4d  Delivery Clinician:  Concepcion Mosher  Living?: Yes   Delivery Location: L&D 3414           APGARS  One minute Five minutes Ten minutes   Skin Color: 0    1       Heart Rate: 2   2         Reflex Irritability: 2   2         Muscle Tone: 2   2       Respiration: 1   2         Total: 7   9           Presentation: Vertex  Position:        Resuscitation Method:  C-PAP; Oxygen; Tactile Stimulation;Suctioning-deep; Suctioning-bulb     Meconium Stained: None    Cord Information: 3 Vessels   Complications: Nuchal Cord Without Compressions  Cord Blood Sent?:  Yes    Blood Gases Sent?:  No    Placenta:  Date/Time:   2:19 PM  Removal: Spontaneous      Appearance: Normal     [place] Measurements:  Birth Weight: 5 lb 2.2 oz (2.33 kg)    Birth Length: 1' 6.7\" (0.475 m)   Head Circumference: 1' 0.2\" (0.31 m)     Chest Circumference: 11.22\" (0.285 m)    Abdominal Girth: 10.63\" (0.27 m)    Other Providers:   COURTNEY RODRIGUEZ;OFELIA DAVALOS;EMRE CROWELL;ROSE SANTO;JOSE DE JESUS YOUNG Obstetrician;Primary Nurse;Primary  Nurse;Pediatrician;Crna           Cord Blood Results:  Information for the patient's :  Mann Bravo [[id number]]   No results found for: ABORH, PCTABR, PCTDIG, BILI, ABORHEXT, ABORH    Information for the patient's :  Mann Bravo [[id number]]     Lab Results   Component Value Date/Time    PHI 7.410 2017 10:33 AM    PCO2I 35.3 2017 10:33 AM    PO2I 34 (LL) 2017 10:33 AM    HCO3I 22.3 2017 10:33 AM    SO2I 66 (L) 2017 10:33 AM    IBD 2 2017 10:33 AM      Information for the patient's :  Mann Bravo [[id number]]   No results found for: EPHV, PCO2V, PO2V, HCO3V, O2STV, EBDV

## 2017-01-23 NOTE — PROGRESS NOTES
Bedside and Verbal shift change report given to Andrei (oncoming nurse) by Alexandro Epley RN (offgoing nurse). Report included the following information SBAR, Kardex, Procedure Summary, Intake/Output, MAR, Recent Results and Med Rec Status.

## 2017-01-23 NOTE — ROUTINE PROCESS
TRANSFER - OUT REPORT:    Verbal report given to THOM Sanchez RN on Bear Brittani  being transferred to Postpartum for routine progression of care       Report consisted of patients Situation, Background, Assessment and   Recommendations(SBAR). Information from the following report(s) SBAR, Procedure Summary, Intake/Output, MAR and Recent Results was reviewed with the receiving nurse. Lines:   Peripheral IV 01/22/17 Right Forearm (Active)   Site Assessment Clean, dry, & intact 1/22/2017  3:15 PM   Phlebitis Assessment 0 1/22/2017  3:15 PM   Infiltration Assessment 0 1/22/2017  3:15 PM   Dressing Status Clean, dry, & intact 1/22/2017  3:15 PM   Dressing Type Transparent 1/22/2017  3:15 PM   Hub Color/Line Status Pink 1/22/2017  3:15 PM   Alcohol Cap Used Yes 1/22/2017  3:15 PM        Opportunity for questions and clarification was provided.       Patient transported with:   Registered Nurse

## 2017-01-23 NOTE — ANESTHESIA PREPROCEDURE EVALUATION
Anesthetic History   No history of anesthetic complications            Review of Systems / Medical History  Patient summary reviewed, nursing notes reviewed and pertinent labs reviewed    Pulmonary  Within defined limits                 Neuro/Psych   Within defined limits           Cardiovascular  Within defined limits                     GI/Hepatic/Renal  Within defined limits              Endo/Other  Within defined limits           Other Findings              Physical Exam    Airway  Mallampati: II  TM Distance: 4 - 6 cm  Neck ROM: normal range of motion   Mouth opening: Normal     Cardiovascular  Regular rate and rhythm,  S1 and S2 normal,  no murmur, click, rub, or gallop             Dental  No notable dental hx       Pulmonary  Breath sounds clear to auscultation               Abdominal  Abdominal exam normal       Other Findings            Anesthetic Plan    ASA: 2  Anesthesia type: epidural            Anesthetic plan and risks discussed with: Patient and Family

## 2017-01-23 NOTE — ROUTINE PROCESS
Bedside and Verbal shift change report given to Hanny Cote (oncoming nurse) by Cindy Cesar RN (offgoing nurse). Report included the following information SBAR, Procedure Summary, Intake/Output, MAR and Recent Results.

## 2017-01-23 NOTE — LACTATION NOTE
Mother breast fed two other babies. This baby is in SCN, NPO. Mother was set up with a pump. Experienced mother. No questions/concerns. Offered assistance if needed.

## 2017-01-23 NOTE — ADT AUTH CERT NOTES
Patient Demographics        Patient Name [address] Sex  Address Phone       Duncan Swain [phone] Female 1985 [address] [phone] (Home)  [phone] (Mobile)           CSN:       [id number]           Admit Date: Admit Time Room Bed       2017  1:56 AM 3411 [[id number]] 01 [[id number]]           Attending Providers        Provider Pager From To       Darrion Rubio MD  17         Delivery Date: 2017   Delivery Time: 2:11 PM   Delivery Type: Vaginal, Spontaneous Delivery  Sex: male   Gestational Age: 29w2d      Measurements:  Birth Weight: 2.33 kg    Birth Length: 18.7\"          Delivery Clinician: Darrion Rubio  Living?:   Delivery Location: L&D

## 2017-01-23 NOTE — ANESTHESIA POSTPROCEDURE EVALUATION
Post-Anesthesia Evaluation and Assessment    Patient: Sunday Nair MRN: [id number]  SSN: [id number]    YOB: 1985  Age: 28 y.o. Sex: female       Cardiovascular Function/Vital Signs  Visit Vitals    BP 97/46 (BP 1 Location: Left arm, BP Patient Position: At rest;Lying right side)    Pulse 75    Temp 36.7 °C (98 °F)    Resp 16    Ht 5' 4\" (1.626 m)    Wt 100.2 kg (221 lb)    SpO2 98%    Breastfeeding Unknown    BMI 37.93 kg/m2       Patient is status post No value filed. anesthesia for * No procedures listed *. Nausea/Vomiting: None    Postoperative hydration reviewed and adequate. Pain:  Pain Scale 1: Numeric (0 - 10) (01/23/17 0900)  Pain Intensity 1: 0 (01/23/17 0900)   Managed    Neurological Status:   Neuro (WDL): Within Defined Limits (01/23/17 0407)   At baseline    Mental Status and Level of Consciousness: Alert and oriented     Pulmonary Status:   O2 Device: Room air (01/23/17 0900)   Adequate oxygenation and airway patent    Complications related to anesthesia: None    Post-anesthesia assessment completed.  No concerns    Signed By: Willow Martinez CRNA     January 23, 2017

## 2017-01-23 NOTE — PROGRESS NOTES
Progress Note    Patient: Dorita Beach MRN: [id number]  SSN: [id number]    YOB: 1985  Age: 28 y.o. Sex: female      Subjective:     Postpartum Day: 1     Delivery: vaginal delivery    Pt denies complaints. She has no complaints. Her infant remains in SCN, but improving. She is pumping. Objective:      Patient Vitals for the past 8 hrs:   BP Temp Pulse Resp   01/23/17 0900 97/46 98 °F (36.7 °C) 75 16   01/23/17 0407 115/74 98 °F (36.7 °C) 76 14     Insert Hgb Here    CV:        CHEST:        Uterine Fundus:   firm       Incision:  no significant drainage, no dehiscence, no significant erythema         Lab/Data Review:  CBC:   Lab Results   Component Value Date/Time    HGB 11.3 (L) 01/23/2017 05:50 AM    HCT 34.6 (L) 01/23/2017 05:50 AM       Assessment:     Status post: Doing well postpartum vaginal delivery   Pre-term delivery. Plan:     Postpartum care discussed including diet, ambulation, and actvitiy restrictions. Discharge instructions and questions answered for vaginal delivery. Desires circ- will be held until infant stabilized.     Signed By: Alina De La O MD     January 23, 2017

## 2017-01-24 VITALS
BODY MASS INDEX: 37.73 KG/M2 | SYSTOLIC BLOOD PRESSURE: 108 MMHG | RESPIRATION RATE: 18 BRPM | HEIGHT: 64 IN | TEMPERATURE: 97.9 F | DIASTOLIC BLOOD PRESSURE: 71 MMHG | OXYGEN SATURATION: 99 % | WEIGHT: 221 LBS | HEART RATE: 75 BPM

## 2017-01-24 PROCEDURE — 74011250637 HC RX REV CODE- 250/637: Performed by: OBSTETRICS & GYNECOLOGY

## 2017-01-24 RX ORDER — IBUPROFEN 800 MG/1
800 TABLET ORAL
Qty: 30 TAB | Refills: 1 | Status: SHIPPED | OUTPATIENT
Start: 2017-01-24

## 2017-01-24 RX ADMIN — IBUPROFEN 800 MG: 400 TABLET ORAL at 09:15

## 2017-01-24 NOTE — DISCHARGE INSTRUCTIONS
[facility]  [address] [address]      PROCEDURE: Spontaneous Vaginal Delivery    Notify [facility] IMMEDIATELY if any of the following occur:       You are unable to urinate. Urgency to urinate is not uncommon.  Excessive vaginal bleeding > 1 maxi pad an hour for more then 2 hours straight.  Temperature above 101.0° and / or chills.  You are nauseous and / or vomiting and you cannot hold down any fluids.  Your pain is not controlled with the pain medication prescribed. Special Considerations:      If you had a  section delivery do not drive for at least 72 hours after the procedure and until you are no longer taking narcotic pain medication and you are able to move and react without hesitation.  For the first two weeks you should rest and take care of yourself and your baby!  You may return to work in 6 weeks. [x] Pelvic rest (nothing in the vagina) for 6 weeks. [x] No heavy lifting over 10 pounds & no strenuous exercise for 6 weeks. [] Other instructions:             Please continue any of your other regular home medications including your vitamins and other supplements. .  If you have not already scheduled your post partum appointment please do so with our office staff. Your post partum appointment should be in 6 weeks. Please contact [facility] or go to the nearest Emergency Department / Urgent Care facility for any other medical questions or concerns and to schedule your post partum visit. After Your Delivery (the Postpartum Period): After Your Visit   Your Care Instructions     Congratulations on the birth of your baby. Like pregnancy, the  period can be a time of excitement, uche, and exhaustion. You may look at your wondrous little baby and feel happy. You may also be overwhelmed by your new sleep hours and new responsibilities.    At first, babies often sleep during the days and are awake at night. They do not have a pattern or routine. They may make sudden gasps, jerk themselves awake, or look like they have crossed eyes. These are all normal, and they may even make you smile. In these first weeks after delivery, try to take good care of yourself. It may take 4 to 6 weeks to feel like yourself again, and possibly longer if you had a  birth. You will likely feel very tired for several weeks. Your days will be full of ups and downs, but lots of uche as well. Follow-up care is a key part of your treatment and safety. Be sure to make and go to all appointments, and call your doctor if you are having problems. It's also a good idea to know your test results and keep a list of the medicines you take. How can you care for yourself at home? Take care of your body after delivery   Use pads instead of tampons for the bloody flow that may last as long as 2 weeks. Ease cramps with ibuprofen (Advil, Motrin). Ease soreness of hemorrhoids and the area between your vagina and rectum with ice compresses or witch hazel pads. Ease constipation by drinking lots of fluid and eating high-fiber foods. Ask your doctor about over-the-counter stool softeners. Cleanse yourself with a gentle squeeze of warm water from a bottle instead of wiping with toilet paper. Take a sitz bath in warm water several times a day. Wear a good nursing bra. Ease sore and swollen breasts with warm, wet washcloths. If you are not breast-feeding, use ice rather than heat for breast soreness. Your period may not start for several months if you are breast-feeding. You may bleed more, and longer at first, than you did before you got pregnant. Wait until you are healed (about 4 to 6 weeks) before you have sexual intercourse. Your doctor will tell you when it is okay to have sex. Try not to travel with your baby for 5 or 6 weeks.  If you take a long car trip, make frequent stops to walk around and stretch. Avoid exhaustion   Rest every day. Try to nap when your baby naps. Ask another adult to be with you for a few days after delivery. Plan for  if you have other children. Stay flexible so you can eat at odd hours and sleep when you need to. Both you and your baby are making new schedules. Plan small trips to get out of the house. Change can make you feel less tired. Ask for help with housework, cooking, and shopping. Remind yourself that your job is to care for your baby. Know about help for postpartum depression   \"Baby blues\" are common for the first 1 to 2 weeks after birth. You may cry or feel sad or irritable for no reason. Rest whenever you can. Being tired makes it harder to handle your emotions. Go for walks with your baby. Talk to your partner, friends, and family about your feelings. If your symptoms last for more than a few weeks, or if you feel very depressed, ask your doctor for help. Postpartum depression can be treated. Support groups and counseling can help. Sometimes medicine can also help. Stay healthy   Eat healthy foods so you have more energy, make good breast milk, and lose extra baby pounds. If you breast-feed, avoid alcohol and drugs. Stay smoke-free. If you quit during pregnancy, congratulations. Start daily exercise after 4 to 6 weeks, but rest when you feel tired. Learn exercises to tone your belly. Do Kegel exercises to regain strength in your pelvic muscles. You can do these exercises while you stand or sit. Squeeze the same muscles you would use to stop your urine. Your belly and thighs should not move. Hold the squeeze for 3 seconds, and then relax for 3 seconds. Start with 3 seconds. Then add 1 second each week until you are able to squeeze for 10 seconds. Repeat the exercise 10 to 15 times for each session. Do three or more sessions each day. Find a class for new mothers and new babies that has an exercise time.    If you had a  birth, give yourself a bit more time before you exercise, and be careful. When should you call for help? Call 911 anytime you think you may need emergency care. For example, call if:   You passed out (lost consciousness). Call your doctor now or seek immediate medical care if:   You have severe vaginal bleeding. This means you are passing blood clots and soaking through a pad each hour for 2 or more hours. You are dizzy or lightheaded, or you feel like you may faint. You have a fever. You have new belly pain, or your pain gets worse. Watch closely for changes in your health, and be sure to contact your doctor if:   Your vaginal bleeding seems to be getting heavier. You have new or worse vaginal discharge. You feel sad, anxious, or hopeless for more than a few days. You do not get better as expected. Where can you learn more? Go to [facility]   Enter A461 in the search box to learn more about \"After Your Delivery (the Postpartum Period): After Your Visit. \"    © 2132-6494 Healthwise, Incorporated. Care instructions adapted under license by [employer] (which disclaims liability or warranty for this information). This care instruction is for use with your licensed healthcare professional. If you have questions about a medical condition or this instruction, always ask your healthcare professional. [place] [address] any warranty or liability for your use of this information.    Content Version: 80.9.793213; Current as of: 2014    Patient armband removed and shredded

## 2017-01-24 NOTE — PROGRESS NOTES
Received report from off going  Shift. 1920 Received pt sitting up in bed, pumping. Pt on her own in room. Pt states  Will call if she needs anything, aware we need to do rounding but would prefer to keep it @ a minimum as she hasn't slept and wakes  Once the door opens a little. Pt did request a stool softener for later . Baby remains in SCN. 2220 Sitting up doing crossword, given senna and Ibuprofen. 0005. Sleeping  0200 Returning from [place] after pumping

## 2017-01-24 NOTE — PROGRESS NOTES
Progress Note    Patient: Sunday Nair MRN: [id number]  SSN: [id number]    YOB: 1985  Age: 28 y.o. Sex: female      Subjective:     Postpartum Day: 2     Delivery: vaginal delivery    Pt seen in SCN visiting , denies complaints. Tearful son has to stay behind. Pain controlled. Lochia minimal.  Tolerating diet. Breast feeding/pumping. No CP/SOB/dizziness. Objective:      Patient Vitals for the past 24 hrs:   Temp Pulse Resp BP   17 0005 97.6 °F (36.4 °C) 72 16 105/61   17 1530 98.4 °F (36.9 °C) 78 18 98/58       GEN: NAD     Lab/Data Review: All lab results for the last 24 hours reviewed. Assessment:     Doing well postpartum vaginal delivery     Plan:   Risk of depression discussed. Advised to call with mood changes. Postpartum care discussed including diet, ambulation, and actvitiy restrictions. Discharge instructions and questions answered for vaginal delivery.     Signed By: Ayse Rhodes DO     2017

## 2017-01-24 NOTE — ANESTHESIA POSTPROCEDURE EVALUATION
Post-Anesthesia Evaluation and Assessment    Patient: Milton Gutierrez MRN: [id number]  SSN: [id number]    YOB: 1985  Age: 28 y.o. Sex: female       Cardiovascular Function/Vital Signs  Visit Vitals    /61 (BP 1 Location: Right arm, BP Patient Position: Lying left side)    Pulse 72    Temp 36.4 °C (97.6 °F)    Resp 16    Ht 5' 4\" (1.626 m)    Wt 100.2 kg (221 lb)    SpO2 98%    Breastfeeding Unknown    BMI 37.93 kg/m2       Patient is status post No value filed. anesthesia for * No procedures listed *. Nausea/Vomiting: None    Postoperative hydration reviewed and adequate. Pain:  Pain Scale 1: Numeric (0 - 10) (01/24/17 0005)  Pain Intensity 1: 0 (01/24/17 0005)   Managed    Neurological Status:   Neuro (WDL): Within Defined Limits (01/23/17 0407)   At baseline    Mental Status and Level of Consciousness: Alert and oriented     Pulmonary Status:   O2 Device: Room air (01/23/17 1530)   Adequate oxygenation and airway patent    Complications related to anesthesia: None    Post-anesthesia assessment completed.  No concerns    Signed By: Bhavya Last CRNA     January 24, 2017

## 2017-01-24 NOTE — DISCHARGE SUMMARY
Obstetrical Discharge Summary     Name: Azar Montilla MRN: [id number]  SSN: [id number]    YOB: 1985  Age: 28 y.o. Sex: female      Admit Date: 2017    Discharge Date: 2017     Admitting Physician: Christen Abbott MD     Attending Physician:  Christen Abbott MD     * Admission Diagnoses: maternity;Labor and delivery, indication for care    * Discharge Diagnoses:   Information for the patient's :  Theresa Garcia [[id number]]   Delivery of a 5 lb 2.2 oz (2.33 kg) male infant via Vaginal, Spontaneous Delivery on 2017 at 2:11 PM  by . Apgars were 7 and 9. Additional Diagnoses:   Hospital Problems as of 2017  Date Reviewed: 2017          Codes Class Noted - Resolved POA     labor, delivered, current hospitalization ICD-10-CM: O60.10X0  ICD-9-CM: 644.21  2017 - Present Unknown        Single live birth ICD-10-CM: Z37.0  ICD-9-CM: V27.0  2017 - Present Unknown             Lab Results   Component Value Date/Time    ABO/Rh(D) A POSITIVE 2017 02:45 AM    Rubella, External IMMUNE  2016    ABO,Rh A Positive 2015      Immunization History   Administered Date(s) Administered    Influenza Vaccine 2016    Tdap 2015       * Procedures:   * No surgery found *           * Discharge Condition: stable    * Hospital Course: Normal hospital course following the delivery. [place] remained in [facility] at time of discharge. * Disposition: Home    Discharge Medications:   Current Discharge Medication List      START taking these medications    Details   ibuprofen (MOTRIN) 800 mg tablet Take 1 Tab by mouth every eight (8) hours as needed. Qty: 30 Tab, Refills: 1         CONTINUE these medications which have NOT CHANGED    Details   albuterol (PROVENTIL HFA, VENTOLIN HFA, PROAIR HFA) 90 mcg/actuation inhaler Take 1-2 Puffs by inhalation every four (4) hours as needed for Wheezing or Shortness of Breath.   Qty: 1 Inhaler, Refills: 1 Associated Diagnoses: Moderate persistent asthma, uncomplicated      NG#[id number] (ONE A DAY WOMEN'S PRENATAL DHA) 28 mg iron- 800 mcg cmpk Take 1 Tab by mouth daily. Indications: PREGNANCY  Qty: 30 Tab, Refills: 11    Associated Diagnoses: 13 weeks gestation of pregnancy      fluticasone-salmeterol (ADVAIR) 100-50 mcg/dose diskus inhaler Take 1 Puff by inhalation every twelve (12) hours. Qty: 1 Inhaler, Refills: 0    Associated Diagnoses: Moderate persistent asthma without complication      Peak Flow Meter cruzito Use as directed two times daily for 2 weeks, log values and bring record to follow up, then as needed with worsening asthma symptoms. Qty: 1 Device, Refills: 0    Associated Diagnoses: Moderate persistent asthma             * Follow-up Care/Patient Instructions:       Follow-up Information     Follow up With Details Comments Contact Info    Harriett Leal MD Schedule an appointment as soon as possible for a visit in 6 weeks postpartum exam [address]  [address] [place] [address]  [phone]             Signed By:  Wendy Nieto DO     January 24, 2017

## 2017-01-24 NOTE — ROUTINE PROCESS
Verbal shift change report given to Hazel Cosby RN (oncoming nurse) by Bulmaro Estes RN (offgoing nurse). Report included the following information Kardex, Intake/Output, MAR and Recent Results. 0930--assessment done--discharge is planned for today for her, but the baby will not be going home--discussed Guest Stay--she is aware of this and will do it when the baby is ready for discharge--voiding without difficulty--no weakness when up--has not had BM--high fiber/high fluid diet encouraged--effective pain management with Ibuprofen--is pumping her breasts and taking the colostrum to the Nursery for storage, as the baby is not feeding orally as yet--POC for discharge discussed--verbalizes understanding.   1300--baby transferred to [address] [place] [address]  1310--discharge instructions reviewed and signed  1330--discharged via wheelchair without baby--stable condition

## 2017-02-07 DIAGNOSIS — J45.40 MODERATE PERSISTENT ASTHMA, UNCOMPLICATED: ICD-10-CM

## 2017-02-07 RX ORDER — ALBUTEROL SULFATE 90 UG/1
1-2 AEROSOL, METERED RESPIRATORY (INHALATION)
Qty: 1 INHALER | Refills: 0 | Status: SHIPPED | OUTPATIENT
Start: 2017-02-07

## 2017-02-07 RX ORDER — ALBUTEROL SULFATE 90 UG/1
1-2 AEROSOL, METERED RESPIRATORY (INHALATION)
Qty: 1 INHALER | Refills: 1 | Status: CANCELLED | OUTPATIENT
Start: 2017-02-07
